# Patient Record
Sex: MALE | Race: WHITE | Employment: FULL TIME | URBAN - NONMETROPOLITAN AREA
[De-identification: names, ages, dates, MRNs, and addresses within clinical notes are randomized per-mention and may not be internally consistent; named-entity substitution may affect disease eponyms.]

---

## 2019-05-08 ENCOUNTER — HOSPITAL ENCOUNTER (EMERGENCY)
Age: 29
Discharge: HOME | End: 2019-05-08
Attending: FAMILY MEDICINE
Payer: COMMERCIAL

## 2019-05-08 VITALS
OXYGEN SATURATION: 100 % | HEART RATE: 72 BPM | BODY MASS INDEX: 25.77 KG/M2 | SYSTOLIC BLOOD PRESSURE: 138 MMHG | TEMPERATURE: 97 F | RESPIRATION RATE: 16 BRPM | HEIGHT: 70 IN | DIASTOLIC BLOOD PRESSURE: 72 MMHG | WEIGHT: 180 LBS

## 2019-05-08 DIAGNOSIS — J20.9 ACUTE BRONCHITIS WITH BRONCHOSPASM: Primary | ICD-10-CM

## 2019-05-08 PROCEDURE — 99283 EMERGENCY DEPT VISIT LOW MDM: CPT

## 2019-05-08 PROCEDURE — 94640 AIRWAY INHALATION TREATMENT: CPT

## 2019-05-08 PROCEDURE — 6370000000 HC RX 637 (ALT 250 FOR IP)

## 2019-05-08 PROCEDURE — 2709999900 HC NON-CHARGEABLE SUPPLY

## 2019-05-08 RX ORDER — IPRATROPIUM BROMIDE AND ALBUTEROL SULFATE 2.5; .5 MG/3ML; MG/3ML
1 SOLUTION RESPIRATORY (INHALATION) ONCE
Status: COMPLETED | OUTPATIENT
Start: 2019-05-08 | End: 2019-05-08

## 2019-05-08 RX ORDER — ALBUTEROL SULFATE 90 UG/1
2 AEROSOL, METERED RESPIRATORY (INHALATION) ONCE
Status: DISCONTINUED | OUTPATIENT
Start: 2019-05-08 | End: 2019-05-08 | Stop reason: HOSPADM

## 2019-05-08 RX ORDER — ALBUTEROL SULFATE 2.5 MG/3ML
SOLUTION RESPIRATORY (INHALATION)
Status: DISCONTINUED
Start: 2019-05-08 | End: 2019-05-08 | Stop reason: WASHOUT

## 2019-05-08 RX ORDER — CODEINE PHOSPHATE AND GUAIFENESIN 10; 100 MG/5ML; MG/5ML
10 SOLUTION ORAL ONCE
Status: DISCONTINUED | OUTPATIENT
Start: 2019-05-08 | End: 2019-05-08 | Stop reason: HOSPADM

## 2019-05-08 RX ORDER — IPRATROPIUM BROMIDE AND ALBUTEROL SULFATE 2.5; .5 MG/3ML; MG/3ML
SOLUTION RESPIRATORY (INHALATION)
Status: COMPLETED
Start: 2019-05-08 | End: 2019-05-08

## 2019-05-08 RX ADMIN — IPRATROPIUM BROMIDE AND ALBUTEROL SULFATE 1 AMPULE: .5; 3 SOLUTION RESPIRATORY (INHALATION) at 02:43

## 2019-05-08 RX ADMIN — IPRATROPIUM BROMIDE AND ALBUTEROL SULFATE 1 AMPULE: 2.5; .5 SOLUTION RESPIRATORY (INHALATION) at 02:43

## 2019-05-08 SDOH — HEALTH STABILITY: MENTAL HEALTH: HOW OFTEN DO YOU HAVE A DRINK CONTAINING ALCOHOL?: NEVER

## 2019-05-08 ASSESSMENT — ENCOUNTER SYMPTOMS
BACK PAIN: 0
VOMITING: 0
STRIDOR: 0
CHEST TIGHTNESS: 0
EYE DISCHARGE: 0
RHINORRHEA: 0
CONSTIPATION: 0
NAUSEA: 0
COUGH: 1
ABDOMINAL PAIN: 0
PHOTOPHOBIA: 0
SHORTNESS OF BREATH: 0
WHEEZING: 1
SINUS PRESSURE: 0
EYE REDNESS: 0
EYE PAIN: 0
DIARRHEA: 0
ABDOMINAL DISTENTION: 0
SORE THROAT: 0

## 2019-05-08 NOTE — ED TRIAGE NOTES
Pt presents with cough ongoing since last wed. Treated by Altagracia Pickering NP on Monday and dx with bronchitis. Given scripts but unsure what they are. Sent scripts to [facility] [place] through [place] and pt having difficulty getting them so after coughing fit tonight decide to seek care at [facility]. Triaged exam room 6. Occasional yellow sputum per pt. Skin warm and dry. Lungs clear.   Denies cp or sob

## 2019-05-08 NOTE — ED PROVIDER NOTES
[facility]       Chief Complaint   Patient presents with    Cough       Nurses Notes reviewed and I agree except as noted in the HPI. HISTORY OF PRESENT ILLNESS    Brea Mora is a 26 y.o. male who presents With cough, or cough at times is spastic according to the patient. Symptoms started about 5-6 days ago. The patient notes some congestion. Denies fever or chills. Notes over-the-counter cough medicine that is not helping that much. REVIEW OF SYSTEMS     Review of Systems   Constitutional: Negative for chills, diaphoresis and fever (Non toxic in appearence). HENT: Negative for congestion, dental problem, ear pain, hearing loss, rhinorrhea, sinus pressure, sore throat and tinnitus. Eyes: Negative for photophobia, pain, discharge, redness and visual disturbance. Respiratory: Positive for cough and wheezing. Negative for chest tightness, shortness of breath and stridor. Cardiovascular: Negative for chest pain, palpitations and leg swelling. Gastrointestinal: Negative for abdominal distention, abdominal pain, constipation, diarrhea, nausea and vomiting. Genitourinary: Negative for difficulty urinating, discharge, dysuria, flank pain, frequency, hematuria, testicular pain and urgency. Musculoskeletal: Negative for arthralgias, back pain, gait problem, joint swelling, myalgias, neck pain and neck stiffness. Skin: Negative for pallor, rash and wound. Neurological: Negative for dizziness, tremors, seizures, syncope, numbness and headaches. Hematological: Negative for adenopathy. Does not bruise/bleed easily. Psychiatric/Behavioral: Negative for agitation, confusion, hallucinations, self-injury and suicidal ideas. The patient is not nervous/anxious. All other systems reviewed and are negative. PAST MEDICAL HISTORY    has a past medical history of Anxiety and GERD (gastroesophageal reflux disease).     SURGICAL HISTORY      has no past surgical history on file. CURRENT MEDICATIONS     Previous Medications    OMEPRAZOLE (PRILOSEC) 40 MG DELAYED RELEASE CAPSULE    Take 40 mg by mouth daily       ALLERGIES     has No Known Allergies. FAMILY HISTORY     has no family status information on file. family history is not on file. SOCIAL HISTORY      reports that he has never smoked. He has quit using smokeless tobacco. He reports that he does not drink alcohol. PHYSICAL EXAM     INITIAL VITALS:  height is 5' 10\" (1.778 m) and weight is 180 lb (81.6 kg). His oral temperature is 97 °F (36.1 °C). His blood pressure is 149/78 (abnormal) and his pulse is 76. His respiration is 18 and oxygen saturation is 99%. Physical Exam   Constitutional: He appears well-developed and well-nourished. No distress. HENT:   Right Ear: External ear normal.   Left Ear: External ear normal.   Nose: Nose normal.   Mouth/Throat: Oropharynx is clear and moist. No oropharyngeal exudate. Eyes: Pupils are equal, round, and reactive to light. Conjunctivae and EOM are normal. Right eye exhibits no discharge. Left eye exhibits no discharge. Neck: Normal range of motion. Neck supple. Cardiovascular: Normal rate, regular rhythm, normal heart sounds and intact distal pulses. No murmur heard. Pulmonary/Chest: Effort normal. No respiratory distress. He has wheezes. Lymphadenopathy:     He has no cervical adenopathy. Skin: Skin is dry. No rash noted. Nursing note and vitals reviewed. DIFFERENTIAL DIAGNOSIS:   URI, bronchitis. DIAGNOSTIC RESULTS     EKG: All EKG's are interpreted by the Emergency Department Physician who either signs or Co-signs this chart in the absence of a cardiologist.      RADIOLOGY: non-plain film images(s) such as CT, Ultrasound and MRI are read by the radiologist.  Plain radiographic images are visualized and preliminarilyinterpreted by the emergency physician unless otherwise stated below.       LABS:   Labs Reviewed - No data to display    EMERGENCY DEPARTMENT COURSE(MDM): Vitals:    Vitals:    05/08/19 0230   BP: (!) 149/78   Pulse: 76   Resp: 18   Temp: 97 °F (36.1 °C)   TempSrc: Oral   SpO2: 99%   Weight: 180 lb (81.6 kg)   Height: 5' 10\" (1.778 m)     Patient non-ill appearing. On exam HE ENT is otherwise unremarkable. Mild expiratory wheeze noted. No other findings on exam.  Patient notes a history of spastic-like cough. The spasticity is most likely related to a bronchospasm. Patient was provided a [employer] during his course of care. I will dispense 10 ML's of a codeine cough suppressant for home use. I will dispense a albuterol metered-dose inhaler that the patient may use 2 puffs every 4-6 hours as needed for wheezing or cough. I recommended a cool mist vaporizer in his room. If his symptoms are not improving over the next 3-4 days and further follow-up would be recommended    CRITICAL CARE:       CONSULTS:  None    PROCEDURES:  None    FINAL IMPRESSION      1. Acute bronchitis with bronchospasm          DISPOSITION/PLAN   Home. Care instructions provided.   Follow up with the PCP/ED if symptoms are not improving over the next 3-4 days    PATIENT REFERRED TO:  Talya Tirado, APRN - CNP  [address] [address]  [phone]    Schedule an appointment as soon as possible for a visit in 3 days  If symptoms worsen, As needed      DISCHARGE MEDICATIONS:  New Prescriptions    No medications on file       (Please note that portionsof this note were completed with a voice recognition program.  Efforts were made to edit the dictations but occasionally words are mis-transcribed.)    MD Daniella Tang MD  05/08/19 4005

## 2019-05-08 NOTE — LETTER
[facility]  [address] [address] [place] [address]  Phone: [phone]               May 8, 2019    Patient: José Antonio Shetty   YOB: 1990   Date of Visit: 5/8/2019       To Whom It May Concern:    Beatrice Lemons was seen and treated in our emergency department on 5/8/2019.  He 05/09/19      Sincerely,       Ziyad Lewis RN         Signature:__________________________________

## 2019-08-12 ENCOUNTER — HOSPITAL ENCOUNTER (EMERGENCY)
Age: 29
Discharge: HOME | End: 2019-08-12
Payer: COMMERCIAL

## 2019-08-12 VITALS
RESPIRATION RATE: 16 BRPM | HEART RATE: 76 BPM | OXYGEN SATURATION: 98 % | TEMPERATURE: 97.8 F | SYSTOLIC BLOOD PRESSURE: 127 MMHG | DIASTOLIC BLOOD PRESSURE: 71 MMHG

## 2019-08-12 DIAGNOSIS — J01.01 ACUTE RECURRENT MAXILLARY SINUSITIS: Primary | ICD-10-CM

## 2019-08-12 DIAGNOSIS — J06.9 UPPER RESPIRATORY TRACT INFECTION, UNSPECIFIED TYPE: ICD-10-CM

## 2019-08-12 PROCEDURE — 99213 OFFICE O/P EST LOW 20 MIN: CPT

## 2019-08-12 PROCEDURE — 99203 OFFICE O/P NEW LOW 30 MIN: CPT | Performed by: NURSE PRACTITIONER

## 2019-08-12 RX ORDER — AMOXICILLIN AND CLAVULANATE POTASSIUM 875; 125 MG/1; MG/1
1 TABLET, FILM COATED ORAL 2 TIMES DAILY
Qty: 20 TABLET | Refills: 0 | Status: SHIPPED | OUTPATIENT
Start: 2019-08-12 | End: 2019-08-22

## 2019-08-12 RX ORDER — FLUTICASONE PROPIONATE 50 MCG
1 SPRAY, SUSPENSION (ML) NASAL DAILY
Qty: 1 BOTTLE | Refills: 0 | Status: SHIPPED | OUTPATIENT
Start: 2019-08-12

## 2019-08-12 RX ORDER — ACETAMINOPHEN 500 MG
1000 TABLET ORAL EVERY 6 HOURS PRN
COMMUNITY

## 2019-08-12 RX ORDER — FLUTICASONE PROPIONATE 50 MCG
1 SPRAY, SUSPENSION (ML) NASAL DAILY
Qty: 1 BOTTLE | Refills: 0 | Status: SHIPPED | OUTPATIENT
Start: 2019-08-12 | End: 2019-08-12 | Stop reason: SDUPTHER

## 2019-08-12 RX ORDER — AMOXICILLIN AND CLAVULANATE POTASSIUM 875; 125 MG/1; MG/1
1 TABLET, FILM COATED ORAL 2 TIMES DAILY
Qty: 20 TABLET | Refills: 0 | Status: SHIPPED | OUTPATIENT
Start: 2019-08-12 | End: 2019-08-12 | Stop reason: SDUPTHER

## 2019-08-12 ASSESSMENT — PAIN DESCRIPTION - PROGRESSION: CLINICAL_PROGRESSION: GRADUALLY WORSENING

## 2019-08-12 ASSESSMENT — ENCOUNTER SYMPTOMS
COUGH: 0
NAUSEA: 0
HOARSE VOICE: 0
VOMITING: 0
STRIDOR: 0
WHEEZING: 0
SWOLLEN GLANDS: 0
CHEST TIGHTNESS: 0
SINUS PRESSURE: 1
SORE THROAT: 0
RHINORRHEA: 0
SHORTNESS OF BREATH: 0
SNORING: 0
SINUS PAIN: 1

## 2019-08-12 ASSESSMENT — PAIN DESCRIPTION - ONSET: ONSET: ON-GOING

## 2019-08-12 ASSESSMENT — PAIN - FUNCTIONAL ASSESSMENT: PAIN_FUNCTIONAL_ASSESSMENT: PREVENTS OR INTERFERES SOME ACTIVE ACTIVITIES AND ADLS

## 2019-08-12 ASSESSMENT — PAIN DESCRIPTION - LOCATION: LOCATION: HEAD

## 2019-08-12 ASSESSMENT — PAIN DESCRIPTION - DESCRIPTORS: DESCRIPTORS: HEADACHE

## 2019-08-12 ASSESSMENT — PAIN SCALES - GENERAL: PAINLEVEL_OUTOF10: 2

## 2019-08-12 ASSESSMENT — PAIN DESCRIPTION - ORIENTATION: ORIENTATION: OTHER (COMMENT)

## 2019-08-12 ASSESSMENT — PAIN DESCRIPTION - PAIN TYPE: TYPE: ACUTE PAIN

## 2019-08-12 ASSESSMENT — PAIN DESCRIPTION - FREQUENCY: FREQUENCY: INTERMITTENT

## 2019-08-12 NOTE — ED NOTES
No change in patients condition. Discharge instructions and prescriptions discussed with pt. Pt. Verbalized understanding of info given and ambulated to exit in stable condition.       Marlon Mcmahan RN  08/12/19 7815

## 2019-08-12 NOTE — ED PROVIDER NOTES
[address]  Urgent Care Encounter       CHIEF COMPLAINT       Chief Complaint   Patient presents with    Sinusitis     sinus headache behind eyes, facial pressure, fatigue since saturday        Nurses Notes reviewed and I agree except as noted in the HPI. HISTORY OF PRESENT ILLNESS   Robert Astudillo is a 26 y.o. male who presents     Patient states that for the last 3 to 4 days he has noticed sinus pressure and congestion. He denies noting any recent fevers. He states that he did have sinus surgery approximately 2 years ago, and was told that if he develops any sinus symptoms should have them treated and managed quickly. Sinusitis   Pain details:     Location:  Maxillary    Quality:  Aching    Severity:  Mild    Duration:  4 days    Timing:  Constant  Duration:  4 days  Progression:  Unchanged  Chronicity:  New  Context: not allergies, not chemical odor, not deviated nasal septum, not recent URI and not smoke inhalation    Relieved by:  Nothing  Worsened by:  Nothing  Ineffective treatments:  Acetaminophen  Associated symptoms: congestion and headaches    Associated symptoms: no chest pain, no chills, no cough, no ear pain, no fatigue, no fever, no hoarse voice, no nausea, no rhinorrhea, no shortness of breath, no sneezing, no snoring, no sore throat, no swollen glands, no tooth pain, no vertigo, no vomiting and no wheezing    Congestion:     Location:  Nasal    Interferes with sleep: no      Interferes with eating/drinking: no    Headaches:     Severity:  Mild    Onset quality:  Gradual    Duration:  4 days    Timing:  Constant    Progression:  Unchanged    Chronicity:  New  Risk factors: no allergic reaction, no asthma, no BiPAP, no COPD, no CPAP use, no cystic fibrosis, no diabetes, no immune deficiency, no nasal cannula, no nasal polyps and no smoke exposure        REVIEW OF SYSTEMS     Review of Systems   Constitutional: Negative for chills, fatigue and fever.    HENT: Positive Constitutional: He is oriented to person, place, and time. He appears well-developed and well-nourished. No distress. HENT:   Mouth/Throat: Posterior oropharyngeal erythema present. No oropharyngeal exudate. Musculoskeletal: Normal range of motion. He exhibits no tenderness. Neurological: He is alert and oriented to person, place, and time. No sensory deficit. Skin: Skin is warm. Capillary refill takes less than 2 seconds. He is not diaphoretic. Psychiatric: He has a normal mood and affect. His behavior is normal. Judgment and thought content normal.       DIAGNOSTIC RESULTS     Labs:No results found for this visit on 08/12/19. IMAGING:    No orders to display     URGENT CARE COURSE:     Vitals:    08/12/19 1751   BP: 127/71   Pulse: 76   Resp: 16   Temp: 97.8 °F (36.6 °C)   TempSrc: Temporal   SpO2: 98%       Medications - No data to display         PROCEDURES:  None    FINAL IMPRESSION      1. Acute recurrent maxillary sinusitis    2. Upper respiratory tract infection, unspecified type          DISPOSITION/ PLAN   Patient is discharged home with prescription for Augmentin as well as Flonase that he should begin today. Discussed with patient that his most upper respiratory infections including a sinusitis are viral in nature, however given the patient's history of frequent infections and surgery will treat with antibiotic. Discussed with patient that he may also continue use of any Tylenol or Motrin for any pain management. Patient should also continue adequate fluid hydration to prevent any dehydration this can also help break up any thick mucus congestion. [facility] with [facility] care provider or [facility] in the next 4 to 5 days if symptoms do not seem to be improving. PATIENT REFERRED TO:  No primary care provider on file. No primary physician on file.       DISCHARGE MEDICATIONS:  Discharge Medication List as of 8/12/2019  6:14 PM      START taking these medications    Details amoxicillin-clavulanate (AUGMENTIN) 875-125 MG per tablet Take 1 tablet by mouth 2 times daily for 10 days, Disp-20 tablet, R-0Print      fluticasone (FLONASE) 50 MCG/ACT nasal spray 1 spray by Each Nostril route daily, Disp-1 Bottle, R-0Print             Discharge Medication List as of 8/12/2019  6:14 PM          Discharge Medication List as of 8/12/2019  6:14 PM          QUINTON Ahmadi NP    (Please note that portions of this note were completed with a voice recognition program. Efforts were made to edit the dictations but occasionally words are mis-transcribed.)         QUINTON Vergara NP  08/12/19 4348

## 2019-09-25 ENCOUNTER — HOSPITAL ENCOUNTER (EMERGENCY)
Age: 29
Discharge: HOME | End: 2019-09-25
Payer: COMMERCIAL

## 2019-09-25 VITALS
RESPIRATION RATE: 14 BRPM | HEART RATE: 61 BPM | OXYGEN SATURATION: 96 % | SYSTOLIC BLOOD PRESSURE: 135 MMHG | BODY MASS INDEX: 25.77 KG/M2 | WEIGHT: 180 LBS | HEIGHT: 70 IN | TEMPERATURE: 97 F | DIASTOLIC BLOOD PRESSURE: 78 MMHG

## 2019-09-25 DIAGNOSIS — J01.01 ACUTE RECURRENT MAXILLARY SINUSITIS: Primary | ICD-10-CM

## 2019-09-25 PROCEDURE — 99212 OFFICE O/P EST SF 10 MIN: CPT

## 2019-09-25 PROCEDURE — 99214 OFFICE O/P EST MOD 30 MIN: CPT | Performed by: NURSE PRACTITIONER

## 2019-09-25 RX ORDER — PREDNISONE 20 MG/1
40 TABLET ORAL DAILY
Qty: 10 TABLET | Refills: 0 | Status: SHIPPED | OUTPATIENT
Start: 2019-09-25 | End: 2019-09-30

## 2019-09-25 RX ORDER — DOXYCYCLINE HYCLATE 100 MG
100 TABLET ORAL 2 TIMES DAILY
Qty: 20 TABLET | Refills: 0 | Status: SHIPPED | OUTPATIENT
Start: 2019-09-25 | End: 2019-10-05

## 2019-09-25 ASSESSMENT — ENCOUNTER SYMPTOMS
SINUS PRESSURE: 1
COUGH: 1
DIARRHEA: 0
NAUSEA: 0
VOMITING: 0
SORE THROAT: 1
SHORTNESS OF BREATH: 0

## 2019-09-25 ASSESSMENT — PAIN DESCRIPTION - LOCATION: LOCATION: EAR;HEAD

## 2019-09-25 ASSESSMENT — PAIN DESCRIPTION - PAIN TYPE: TYPE: ACUTE PAIN

## 2019-09-25 ASSESSMENT — PAIN DESCRIPTION - PROGRESSION: CLINICAL_PROGRESSION: GRADUALLY WORSENING

## 2019-09-25 ASSESSMENT — PAIN SCALES - GENERAL: PAINLEVEL_OUTOF10: 4

## 2019-09-25 ASSESSMENT — PAIN - FUNCTIONAL ASSESSMENT: PAIN_FUNCTIONAL_ASSESSMENT: ACTIVITIES ARE NOT PREVENTED

## 2019-09-25 ASSESSMENT — PAIN DESCRIPTION - ORIENTATION: ORIENTATION: LEFT

## 2019-09-25 ASSESSMENT — PAIN DESCRIPTION - DESCRIPTORS: DESCRIPTORS: ACHING

## 2019-09-25 ASSESSMENT — PAIN DESCRIPTION - ONSET: ONSET: GRADUAL

## 2019-09-25 ASSESSMENT — PAIN DESCRIPTION - FREQUENCY: FREQUENCY: CONTINUOUS

## 2019-09-25 NOTE — ED PROVIDER NOTES
medications    Details   doxycycline hyclate (VIBRA-TABS) 100 MG tablet Take 1 tablet by mouth 2 times daily for 10 days, Disp-20 tablet, R-0Normal      predniSONE (DELTASONE) 20 MG tablet Take 2 tablets by mouth daily for 5 days, Disp-10 tablet, R-0Normal             Discharge Medication List as of 9/25/2019  2:13 PM          Discharge Medication List as of 9/25/2019  2:13 PM          QUINTON Livingston - PEPE    (Please note that portions of this note were completed with a voice recognition program. Efforts were made to edit the dictations but occasionally words are mis-transcribed.)         QUINTON Livingston CNP  09/25/19 8494

## 2022-01-12 ENCOUNTER — HOSPITAL ENCOUNTER (EMERGENCY)
Age: 32
Discharge: HOME | End: 2022-01-12
Attending: EMERGENCY MEDICINE
Payer: OTHER GOVERNMENT

## 2022-01-12 VITALS
SYSTOLIC BLOOD PRESSURE: 122 MMHG | TEMPERATURE: 98.6 F | OXYGEN SATURATION: 99 % | HEIGHT: 70 IN | RESPIRATION RATE: 14 BRPM | DIASTOLIC BLOOD PRESSURE: 77 MMHG | HEART RATE: 71 BPM | BODY MASS INDEX: 26.48 KG/M2 | WEIGHT: 185 LBS

## 2022-01-12 DIAGNOSIS — R07.89 ATYPICAL CHEST PAIN: Primary | ICD-10-CM

## 2022-01-12 DIAGNOSIS — R00.2 PALPITATIONS: ICD-10-CM

## 2022-01-12 LAB
ALBUMIN SERPL-MCNC: 4.4 GM/DL (ref 3.4–5)
ALP BLD-CCNC: 83 U/L (ref 46–116)
ALT SERPL-CCNC: 24 U/L (ref 14–63)
ANION GAP: 8 MEQ/L (ref 8–16)
AST SERPL-CCNC: 16 U/L (ref 15–37)
BASOPHILS # BLD: 0.7 % (ref 0–3)
BILIRUB SERPL-MCNC: 0.5 MG/DL (ref 0.2–1)
BUN BLDV-MCNC: 12 MG/DL (ref 7–18)
CHLORIDE BLD-SCNC: 102 MEQ/L (ref 98–107)
CO2: 30 MEQ/L (ref 21–32)
CREAT SERPL-MCNC: 0.9 MG/DL (ref 0.6–1.3)
EOSINOPHILS RELATIVE PERCENT: 3.7 % (ref 0–4)
GFR, ESTIMATED: > 90 ML/MIN/1.73M2
GLUCOSE BLD-MCNC: 94 MG/DL (ref 74–106)
HCT VFR BLD CALC: 45.4 % (ref 42–52)
HEMOGLOBIN: 15.1 GM/DL (ref 14–18)
LYMPHOCYTES # BLD: 29.1 % (ref 15–47)
MCH RBC QN AUTO: 31 PG (ref 27–31)
MCHC RBC AUTO-ENTMCNC: 33.3 GM/DL (ref 33–37)
MCV RBC AUTO: 93 FL (ref 80–94)
MONOCYTES: 7.6 % (ref 0–12)
PDW BLD-RTO: 12.7 % (ref 11.5–14.5)
PLATELET # BLD: 207 THOU/MM3 (ref 130–400)
PMV BLD AUTO: 7.8 FL (ref 7.4–10.4)
POC CALCIUM: 9.2 MG/DL (ref 8.5–10.1)
POTASSIUM SERPL-SCNC: 3.9 MEQ/L (ref 3.5–5.1)
RBC # BLD: 4.88 MILL/MM3 (ref 4.7–6.1)
SEGS: 58.9 % (ref 43–75)
SODIUM BLD-SCNC: 140 MEQ/L (ref 136–145)
TOTAL PROTEIN: 7.4 GM/DL (ref 6.4–8.2)
TROPONIN I: < 0.017 NG/ML (ref 0.02–0.06)
TROPONIN I: < 0.017 NG/ML (ref 0.02–0.06)
WBC # BLD: 7.3 THOU/MM3 (ref 4.8–10.8)

## 2022-01-12 PROCEDURE — 84484 ASSAY OF TROPONIN QUANT: CPT

## 2022-01-12 PROCEDURE — 99283 EMERGENCY DEPT VISIT LOW MDM: CPT

## 2022-01-12 PROCEDURE — 80053 COMPREHEN METABOLIC PANEL: CPT

## 2022-01-12 PROCEDURE — 93005 ELECTROCARDIOGRAM TRACING: CPT | Performed by: EMERGENCY MEDICINE

## 2022-01-12 PROCEDURE — 85025 COMPLETE CBC W/AUTO DIFF WBC: CPT

## 2022-01-12 PROCEDURE — 6370000000 HC RX 637 (ALT 250 FOR IP): Performed by: EMERGENCY MEDICINE

## 2022-01-12 RX ORDER — ASPIRIN 81 MG/1
324 TABLET, CHEWABLE ORAL ONCE
Status: COMPLETED | OUTPATIENT
Start: 2022-01-12 | End: 2022-01-12

## 2022-01-12 RX ORDER — BUPROPION HYDROCHLORIDE 150 MG/1
150 TABLET ORAL EVERY MORNING
COMMUNITY

## 2022-01-12 RX ORDER — M-VIT,TX,IRON,MINS/CALC/FOLIC 27MG-0.4MG
1 TABLET ORAL DAILY
COMMUNITY

## 2022-01-12 RX ADMIN — ASPIRIN 81 MG 324 MG: 81 TABLET ORAL at 14:28

## 2022-01-12 ASSESSMENT — PAIN SCALES - GENERAL: PAINLEVEL_OUTOF10: 2

## 2022-01-12 ASSESSMENT — ENCOUNTER SYMPTOMS
EYE DISCHARGE: 0
BLOOD IN STOOL: 0
DIARRHEA: 0
SORE THROAT: 0
EYE PAIN: 0
WHEEZING: 0
ABDOMINAL PAIN: 0
SHORTNESS OF BREATH: 0

## 2022-01-12 ASSESSMENT — PAIN DESCRIPTION - PAIN TYPE: TYPE: ACUTE PAIN

## 2022-01-12 ASSESSMENT — PAIN DESCRIPTION - DESCRIPTORS: DESCRIPTORS: DISCOMFORT

## 2022-01-12 ASSESSMENT — PAIN DESCRIPTION - ORIENTATION: ORIENTATION: LEFT

## 2022-01-12 ASSESSMENT — PAIN DESCRIPTION - LOCATION: LOCATION: CHEST

## 2022-01-12 ASSESSMENT — HEART SCORE: ECG: 0

## 2022-01-12 NOTE — ED NOTES
Patient presents to the ED via private auto with complaints of palpitations and heart racing that occurred when he was getting his lunch at work. States that the incident only lasted a couple of minutes but states that his chest was then achy after. Denies previous cardiac issues.        Yessica Ward RN  01/12/22 4540

## 2022-01-12 NOTE — LETTER
[place] [address]  Phone: [phone]               January 12, 2022    Patient: Tyrus Kussmaul   YOB: 1990   Date of Visit: 1/12/2022       To Whom It May Concern:    Hao Joseph was seen and treated in our emergency department on 1/12/2022. He may return to work on 1/13/22.       Sincerely,       Domi Brooks RN         Signature:__________________________________

## 2022-01-12 NOTE — ED NOTES
Patient states that he has not felt the palpitations or heart racing since he has been here. States that the achyness in his chest has resolved.      Inderjit Narvaez RN  01/12/22 3971

## 2022-01-12 NOTE — ED NOTES
Discharge teaching and instructions for condition explained to patient. AVS reviewed. Patient voiced understanding regarding follow up appointments and care of self at home. Pt discharged to home in stable condition per self.        Vasyl Dickinson RN  01/12/22 1802

## 2022-01-12 NOTE — ED PROVIDER NOTES
[address]  [address]  Phone: [phone]    eMERGENCY dEPARTMENT eNCOUnter           [address] [facility]       Chief Complaint   Patient presents with    Palpitations       Nurses Notes reviewed and I agree except as noted in the HPI. HISTORY OF PRESENT ILLNESS    Manuel Gordon is a 27 y.o. male who presented via private vehicle with above-mentioned complaints. Symptoms been an hour prior to arrival.  He felt his heart either skipping a beat or was fast.  It lasted for about a minute. He has no loss of consciousness, lightheadedness or dizziness. He is complaining of chest pain which he describes as \"aching\" he said that his pain has been persistent over the last hour. Pain did not radiate and radiate anywhere and was not relieved or exacerbated by anything. He has no history of heart or lung disease. He denies smoking tobacco or using illicit drugs. .  He has low risk factors for CAD. REVIEW OF SYSTEMS     Review of Systems   Constitutional: Negative for chills and fever. HENT: Negative for sore throat. Eyes: Negative for pain and discharge. Respiratory: Negative for shortness of breath and wheezing. Cardiovascular: Positive for chest pain and palpitations. Gastrointestinal: Negative for abdominal pain, blood in stool and diarrhea. Genitourinary: Negative for dysuria and hematuria. Musculoskeletal: Negative for neck pain and neck stiffness. Neurological: Negative for seizures, syncope and headaches. Hematological: Negative for adenopathy. Psychiatric/Behavioral: Negative for agitation and hallucinations. PAST MEDICAL HISTORY    has a past medical history of Anxiety, GERD (gastroesophageal reflux disease), Polyp of maxillary sinus, and Sleep apnea. SURGICAL HISTORY      has a past surgical history that includes Nasal sinus surgery (2017).     CURRENT MEDICATIONS       Previous Medications    ACETAMINOPHEN (TYLENOL) 500 MG TABLET    Take 1,000 mg by mouth every 6 hours as needed for Pain    BUPROPION (WELLBUTRIN XL) 150 MG EXTENDED RELEASE TABLET    Take 150 mg by mouth every morning    FEXOFENADINE HCL (ALLEGRA ALLERGY PO)    Take by mouth    FLUTICASONE (FLONASE) 50 MCG/ACT NASAL SPRAY    1 spray by Each Nostril route daily    MULTIPLE VITAMINS-MINERALS (THERAPEUTIC MULTIVITAMIN-MINERALS) TABLET    Take 1 tablet by mouth daily    PHENYLEPHRINE-DM-GG-APAP (SUDAFED PE PRESSURE+PAIN+COLD PO)    Take by mouth       ALLERGIES     has No Known Allergies. FAMILY HISTORY     He indicated that his mother is alive. He indicated that his father is alive. family history includes Depression in his mother; Diabetes in his father; High Blood Pressure in his father and mother. SOCIAL HISTORY      reports that he has never smoked. He has quit using smokeless tobacco. He reports that he does not drink alcohol and does not use drugs. PHYSICAL EXAM     INITIAL VITALS:  height is 5' 10\" (1.778 m) and weight is 185 lb (83.9 kg). His temperature is 98.6 °F (37 °C). His blood pressure is 122/77 and his pulse is 71. His respiration is 14 and oxygen saturation is 99%. Physical Exam  Constitutional:       General: He is not in acute distress. Appearance: He is well-developed. HENT:      Head: Atraumatic. Eyes:      Conjunctiva/sclera: Conjunctivae normal.      Pupils: Pupils are equal, round, and reactive to light. Neck:      Thyroid: No thyromegaly. Vascular: No JVD. Trachea: No tracheal deviation. Cardiovascular:      Rate and Rhythm: Normal rate and regular rhythm. Heart sounds: No murmur heard. No friction rub. No gallop. Pulmonary:      Effort: Pulmonary effort is normal.      Breath sounds: Normal breath sounds. Abdominal:      General: Bowel sounds are normal.      Palpations: Abdomen is soft. Tenderness: There is no abdominal tenderness. Musculoskeletal:         General: No tenderness. Cervical back: Neck supple. Right lower leg: No edema. Left lower leg: No edema. Neurological:      Mental Status: He is alert. DIFFERENTIAL DIAGNOSIS:       DIAGNOSTIC RESULTS     EKG:  EKG Interpretation    Interpreted by me    Rhythm: normal sinus   Rate: normal  Axis: normal  Ectopy: none  Conduction: normal  ST Segments: no acute change  T Waves: no acute change  Q Waves: none    Clinical Impression: no acute changes and normal EKG    RADIOLOGY:  LABS:   Labs Reviewed   CBC WITH AUTO DIFFERENTIAL   TROPONIN   COMPREHENSIVE METABOLIC PANEL   GLOMERULAR FILTRATION RATE, ESTIMATED   ANION GAP   TROPONIN   Laboratory studies were unremarkable. EMERGENCY DEPARTMENT COURSE:   Vitals:    Vitals:    01/12/22 1230 01/12/22 1300 01/12/22 1330 01/12/22 1415   BP: 131/84 122/72 118/67 122/77   Pulse: 70 67 65 71   Resp: 27 16 17 14   Temp:       SpO2: 100% 98% 98% 99%   Weight:       Height:         Patient was placed on cardiac monitor. He received 4 baby aspirin. He had no arrhythmia. He remained hemodynamically stable. Reevaluation at 3 PM:  He was awake and alert, he denies chest pain. I discussed diagnosis and treatment plan with him. He has low risk factors for CAD. His heart score was 0. His pain sounds atypical.  He was advised to obtain an outpatient stress test.    FINAL IMPRESSION      1. Atypical chest pain    2. Palpitations          DISPOSITION/PLAN     Discharged home in good condition.   PATIENT REFERRED TO:    your family doctor at the [facility]:  New Prescriptions    No medications on file       (Please note that portions of this note were completed with a voice recognition program.  Efforts were made to edit the dictations but occasionally words are mis-transcribed.)    MD Bryan Kennedy MD  01/12/22 7115

## 2022-01-13 LAB
EKG ATRIAL RATE: 60 BPM
EKG ATRIAL RATE: 61 BPM
EKG P AXIS: 57 DEGREES
EKG P AXIS: 59 DEGREES
EKG P-R INTERVAL: 166 MS
EKG P-R INTERVAL: 186 MS
EKG Q-T INTERVAL: 370 MS
EKG Q-T INTERVAL: 376 MS
EKG QRS DURATION: 96 MS
EKG QRS DURATION: 96 MS
EKG QTC CALCULATION (BAZETT): 370 MS
EKG QTC CALCULATION (BAZETT): 378 MS
EKG R AXIS: 1 DEGREES
EKG R AXIS: 11 DEGREES
EKG T AXIS: 57 DEGREES
EKG T AXIS: 61 DEGREES
EKG VENTRICULAR RATE: 60 BPM
EKG VENTRICULAR RATE: 61 BPM